# Patient Record
Sex: FEMALE | Race: WHITE | NOT HISPANIC OR LATINO | Employment: FULL TIME | URBAN - NONMETROPOLITAN AREA
[De-identification: names, ages, dates, MRNs, and addresses within clinical notes are randomized per-mention and may not be internally consistent; named-entity substitution may affect disease eponyms.]

---

## 2018-10-18 ENCOUNTER — OFFICE VISIT (OUTPATIENT)
Dept: GASTROENTEROLOGY | Facility: CLINIC | Age: 48
End: 2018-10-18

## 2018-10-18 VITALS
SYSTOLIC BLOOD PRESSURE: 100 MMHG | DIASTOLIC BLOOD PRESSURE: 60 MMHG | HEIGHT: 63 IN | HEART RATE: 89 BPM | OXYGEN SATURATION: 99 % | WEIGHT: 189.2 LBS | BODY MASS INDEX: 33.52 KG/M2

## 2018-10-18 DIAGNOSIS — Z12.11 ENCOUNTER FOR SCREENING COLONOSCOPY: Primary | ICD-10-CM

## 2018-10-18 PROCEDURE — S0285 CNSLT BEFORE SCREEN COLONOSC: HCPCS | Performed by: NURSE PRACTITIONER

## 2018-10-18 RX ORDER — DEXTROSE AND SODIUM CHLORIDE 5; .45 G/100ML; G/100ML
30 INJECTION, SOLUTION INTRAVENOUS CONTINUOUS PRN
Status: CANCELLED | OUTPATIENT
Start: 2018-12-14

## 2018-10-18 RX ORDER — SODIUM, POTASSIUM,MAG SULFATES 17.5-3.13G
1 SOLUTION, RECONSTITUTED, ORAL ORAL EVERY 12 HOURS
Qty: 2 BOTTLE | Refills: 0 | Status: ON HOLD | OUTPATIENT
Start: 2018-10-18 | End: 2018-12-14

## 2018-10-18 NOTE — PATIENT INSTRUCTIONS
Colonoscopy, Adult  A colonoscopy is an exam to look at the entire large intestine. During the exam, a lubricated, bendable tube is inserted into the anus and then passed into the rectum, colon, and other parts of the large intestine.  A colonoscopy is often done as a part of normal colorectal screening or in response to certain symptoms, such as anemia, persistent diarrhea, abdominal pain, and blood in the stool. The exam can help screen for and diagnose medical problems, including:  · Tumors.  · Polyps.  · Inflammation.  · Areas of bleeding.    Tell a health care provider about:  · Any allergies you have.  · All medicines you are taking, including vitamins, herbs, eye drops, creams, and over-the-counter medicines.  · Any problems you or family members have had with anesthetic medicines.  · Any blood disorders you have.  · Any surgeries you have had.  · Any medical conditions you have.  · Any problems you have had passing stool.  What are the risks?  Generally, this is a safe procedure. However, problems may occur, including:  · Bleeding.  · A tear in the intestine.  · A reaction to medicines given during the exam.  · Infection (rare).    What happens before the procedure?  Eating and drinking restrictions  Follow instructions from your health care provider about eating and drinking, which may include:  · A few days before the procedure - follow a low-fiber diet. Avoid nuts, seeds, dried fruit, raw fruits, and vegetables.  · 1-3 days before the procedure - follow a clear liquid diet. Drink only clear liquids, such as clear broth or bouillon, black coffee or tea, clear juice, clear soft drinks or sports drinks, gelatin dessert, and popsicles. Avoid any liquids that contain red or purple dye.  · On the day of the procedure - do not eat or drink anything during the 2 hours before the procedure, or within the time period that your health care provider recommends.    Bowel prep  If you were prescribed an oral bowel prep  to clean out your colon:  · Take it as told by your health care provider. Starting the day before your procedure, you will need to drink a large amount of medicated liquid. The liquid will cause you to have multiple loose stools until your stool is almost clear or light green.  · If your skin or anus gets irritated from diarrhea, you may use these to relieve the irritation:  ? Medicated wipes, such as adult wet wipes with aloe and vitamin E.  ? A skin soothing-product like petroleum jelly.  · If you vomit while drinking the bowel prep, take a break for up to 60 minutes and then begin the bowel prep again. If vomiting continues and you cannot take the bowel prep without vomiting, call your health care provider.    General instructions  · Ask your health care provider about changing or stopping your regular medicines. This is especially important if you are taking diabetes medicines or blood thinners.  · Plan to have someone take you home from the hospital or clinic.  What happens during the procedure?  · An IV tube may be inserted into one of your veins.  · You will be given medicine to help you relax (sedative).  · To reduce your risk of infection:  ? Your health care team will wash or sanitize their hands.  ? Your anal area will be washed with soap.  · You will be asked to lie on your side with your knees bent.  · Your health care provider will lubricate a long, thin, flexible tube. The tube will have a camera and a light on the end.  · The tube will be inserted into your anus.  · The tube will be gently eased through your rectum and colon.  · Air will be delivered into your colon to keep it open. You may feel some pressure or cramping.  · The camera will be used to take images during the procedure.  · A small tissue sample may be removed from your body to be examined under a microscope (biopsy). If any potential problems are found, the tissue will be sent to a lab for testing.  · If small polyps are found, your  health care provider may remove them and have them checked for cancer cells.  · The tube that was inserted into your anus will be slowly removed.  The procedure may vary among health care providers and hospitals.  What happens after the procedure?  · Your blood pressure, heart rate, breathing rate, and blood oxygen level will be monitored until the medicines you were given have worn off.  · Do not drive for 24 hours after the exam.  · You may have a small amount of blood in your stool.  · You may pass gas and have mild abdominal cramping or bloating due to the air that was used to inflate your colon during the exam.  · It is up to you to get the results of your procedure. Ask your health care provider, or the department performing the procedure, when your results will be ready.  This information is not intended to replace advice given to you by your health care provider. Make sure you discuss any questions you have with your health care provider.  Document Released: 12/15/2001 Document Revised: 10/18/2017 Document Reviewed: 02/28/2017  [employer] Interactive Patient Education © 2018 [employer]

## 2018-10-18 NOTE — PROGRESS NOTES
Chief Complaint   Patient presents with   • Colon Cancer Screening       Subjective    Merry Erickson is a 47 y.o. female. she is being seen for consultation today at the request of Dr. Batista   History of Present Illness  47-year-old female presents to discuss screening colonoscopy.  She denies any abdominal pain, nausea, vomiting or changes in her bowel habits.  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  Currently on no medications.  Reports she has irritable bowel syndrome and occasional urgency and diarrhea if she eats certain foods such as onions.  Denies any melena or hematochezia.  Her weight is stable.  Plan; schedule patient for screening colonoscopy.       The following portions of the patient's history were reviewed and updated as appropriate:   History reviewed. No pertinent past medical history.  History reviewed. No pertinent surgical history.  Family History   Problem Relation Age of Onset   • Colon cancer Paternal Grandmother      OB History      Para Term  AB Living    4 4 4          SAB TAB Ectopic Molar Multiple Live Births                       Current Outpatient Prescriptions   Medication Sig Dispense Refill   • sodium-potassium-magnesium sulfates (SUPREP BOWEL PREP KIT) 17.5-3.13-1.6 GM/177ML solution oral solution Take 1 bottle by mouth Every 12 (Twelve) Hours. 2 bottle 0     No current facility-administered medications for this visit.      No Known Allergies  Social History     Social History   • Marital status:      Social History Main Topics   • Smoking status: Never Smoker   • Smokeless tobacco: Never Used   • Alcohol use No   • Drug use: No   • Sexual activity: Defer     Other Topics Concern   • Not on file       Review of Systems  Review of Systems   Constitutional: Negative for activity change, appetite change, chills, diaphoresis, fatigue, fever and unexpected weight change.   HENT: Negative for sore throat and trouble swallowing.   "  Respiratory: Negative for shortness of breath.    Gastrointestinal: Negative for abdominal distention, abdominal pain, anal bleeding, blood in stool, constipation, diarrhea, nausea, rectal pain and vomiting.   Musculoskeletal: Negative for arthralgias.   Skin: Negative for pallor.   Neurological: Negative for light-headedness.        /60 (BP Location: Left arm)   Pulse 89   Ht 158.8 cm (62.5\")   Wt 85.8 kg (189 lb 3.2 oz)   SpO2 99%   BMI 34.05 kg/m²     Objective    Physical Exam   Constitutional: She is oriented to person, place, and time. She appears well-developed and well-nourished. She is cooperative. No distress.   HENT:   Head: Normocephalic and atraumatic.   Neck: Normal range of motion. Neck supple. No thyromegaly present.   Cardiovascular: Normal rate, regular rhythm and normal heart sounds.    Pulmonary/Chest: Effort normal and breath sounds normal. She has no wheezes. She has no rhonchi. She has no rales.   Abdominal: Soft. Normal appearance and bowel sounds are normal. She exhibits no distension. There is no hepatosplenomegaly. There is no tenderness. There is no rigidity and no guarding. No hernia.   Lymphadenopathy:     She has no cervical adenopathy.   Neurological: She is alert and oriented to person, place, and time.   Skin: Skin is warm, dry and intact. No rash noted. No pallor.   Psychiatric: She has a normal mood and affect. Her speech is normal.     Conversion Encounter on 04/07/2014   Component Date Value Ref Range Status   • Spec Descr 1 04/07/2014 SPECIMEN(S): Cervical/Endocervical   Final   • Clinical Information 04/07/2014 CLINICAL HISTORY: Reason for Pap Smear: Routine Smear, LMP: 3/10/14   Final   • Specimen Adequacy 04/07/2014    Final                    Value:  SPECIMEN ADEQUACY:  Satisfactory for evaluation:  endocervical/transformation zone component  present.     • Final Diagnosis 04/07/2014    Final                    Value:  INTERPRETATION:  Benign cellular changes, " reactive cellular changes.  Negative for intraepithelial lesion or malignancy.     • CONVERTED (HISTORICAL) FINAL PATHO* 04/07/2014    Final                    Value:Diagnostician:  HERIBERTO ORTIZ(ASCP)  Cytotechnologist     • Final Pathologist/Cyto tech 3 04/07/2014    Final                    Value:Diagnostician:  KAYLA SWEENEY M.D.  Pathologist  Electronically Signed 04/10/2014     • ICD9 Diagnosis Code 1 04/07/2014 ICD-9: V72.31   Final   • DISCLAIMER 04/07/2014    Final                    Value:The Pap smear is a screening test that aids in the detection of cervical  cancer and cancer precursors.  Both false positive and false negative  results can occur.  The test should be used at regular intervals, and positive results  should be confirmed before definitive therapy.       Assessment/Plan      1. Encounter for screening colonoscopy    .       Orders placed during this encounter include:  Orders Placed This Encounter   Procedures   • Follow Anesthesia Guidelines / Standing Orders     Standing Status:   Future       COLONOSCOPY (N/A)    Review and/or summary of lab tests, radiology, procedures, medications. Review and summary of old records and obtaining of history. The risks and benefits of my recommendations, as well as other treatment options were discussed with the patient today. Questions were answered.    New Medications Ordered This Visit   Medications   • sodium-potassium-magnesium sulfates (SUPREP BOWEL PREP KIT) 17.5-3.13-1.6 GM/177ML solution oral solution     Sig: Take 1 bottle by mouth Every 12 (Twelve) Hours.     Dispense:  2 bottle     Refill:  0       Follow-up: Return in about 8 weeks (around 12/13/2018).          This document has been electronically signed by LAISHA Gotti on October 18, 2018 4:21 PM             Results for orders placed or performed in visit on 04/07/14   Converted (Historical) Gyn Cytology   Result Value Ref Range    Spec Descr 1 SPECIMEN(S):  Cervical/Endocervical     Clinical Information       CLINICAL HISTORY: Reason for Pap Smear: Routine Smear, LMP: 3/10/14    Specimen Adequacy         SPECIMEN ADEQUACY:  Satisfactory for evaluation:  endocervical/transformation zone component  present.      Final Diagnosis         INTERPRETATION:  Benign cellular changes, reactive cellular changes.  Negative for intraepithelial lesion or malignancy.      CONVERTED (HISTORICAL) FINAL PATHOLOGIST       Diagnostician:  HERIBERTO BLOCK CT([facility])  Cytotechnologist      Final Pathologist/Cyto tech 3       Diagnostician:  KAYLA SWEENEY M.D.  Pathologist  Electronically Signed 04/10/2014      ICD9 Diagnosis Code 1 ICD-9: V72.31     DISCLAIMER       The Pap smear is a screening test that aids in the detection of cervical  cancer and cancer precursors.  Both false positive and false negative  results can occur.  The test should be used at regular intervals, and positive results  should be confirmed before definitive therapy.     Results for orders placed or performed in visit on 05/24/11   Converted (Historical) Gyn Cytology   Result Value Ref Range    Spec Descr 1 SPECIMEN(S): Cervical/Endocervical     Clinical Information       CLINICAL HISTORY: Reason for Pap Smear: Routine Smear, LMP: 5/15/11    Specimen Adequacy         SPECIMEN ADEQUACY:  Satisfactory for evaluation:  endocervical/transformation zone component  present.      Final Diagnosis         INTERPRETATION:  Negative for intraepithelial lesion or malignancy.      CONVERTED (HISTORICAL) FINAL PATHOLOGIST       Diagnostician:  HERIBERTO BLOCK CT([facility])  Cytotechnologist  Electronically Signed 05/26/2011      ICD9 Diagnosis Code 1 ICD-9: V72.31     DISCLAIMER       The Pap smear is a screening test that aids in the detection of cervical  cancer and cancer precursors.  Both false positive and false negative  results can occur.  The test should be used at regular intervals, and positive results  should be confirmed  before definitive therapy.     Results for orders placed or performed in visit on 09/21/09   Converted (Historical) Gyn Cytology   Result Value Ref Range    Spec Descr 1 SPECIMEN(S): Cervical/Endocervical     Clinical Information       CLINICAL HISTORY: Reason for Pap Smear: Routine Smear, LMP: 9/10/09    Specimen Adequacy         SPECIMEN ADEQUACY:  Satisfactory for evaluation:  endocervical/transformation zone component  present.      Final Diagnosis         INTERPRETATION:  Negative for intraepithelial lesion or malignancy.      CONVERTED (HISTORICAL) FINAL PATHOLOGIST       Diagnostician:  RADHA ORTIZ([facility])  Cytotechnologist  Electronically Signed 09/23/2009      ICD9 Diagnosis Code 1 ICD-9: V72.31     DISCLAIMER       The Pap smear is a screening test that aids in the detection of cervical  cancer and cancer precursors.  Both false positive and false negative  results can occur.  The test should be used at regular intervals, and positive results  should be confirmed before definitive therapy.

## 2018-12-14 ENCOUNTER — ANESTHESIA EVENT (OUTPATIENT)
Dept: GASTROENTEROLOGY | Facility: HOSPITAL | Age: 48
End: 2018-12-14

## 2018-12-14 ENCOUNTER — ANESTHESIA (OUTPATIENT)
Dept: GASTROENTEROLOGY | Facility: HOSPITAL | Age: 48
End: 2018-12-14

## 2018-12-14 ENCOUNTER — HOSPITAL ENCOUNTER (OUTPATIENT)
Facility: HOSPITAL | Age: 48
Setting detail: HOSPITAL OUTPATIENT SURGERY
Discharge: HOME | End: 2018-12-14
Attending: INTERNAL MEDICINE | Admitting: INTERNAL MEDICINE

## 2018-12-14 VITALS
HEART RATE: 86 BPM | BODY MASS INDEX: 31.89 KG/M2 | WEIGHT: 180 LBS | HEIGHT: 63 IN | DIASTOLIC BLOOD PRESSURE: 66 MMHG | OXYGEN SATURATION: 98 % | SYSTOLIC BLOOD PRESSURE: 102 MMHG | RESPIRATION RATE: 18 BRPM | TEMPERATURE: 98.2 F

## 2018-12-14 DIAGNOSIS — Z12.11 ENCOUNTER FOR SCREENING COLONOSCOPY: ICD-10-CM

## 2018-12-14 LAB — B-HCG UR QL: NEGATIVE

## 2018-12-14 PROCEDURE — 25010000002 PROPOFOL 10 MG/ML EMULSION: Performed by: NURSE ANESTHETIST, CERTIFIED REGISTERED

## 2018-12-14 PROCEDURE — 45378 DIAGNOSTIC COLONOSCOPY: CPT | Performed by: INTERNAL MEDICINE

## 2018-12-14 PROCEDURE — 81025 URINE PREGNANCY TEST: CPT | Performed by: INTERNAL MEDICINE

## 2018-12-14 RX ORDER — PROPOFOL 10 MG/ML
VIAL (ML) INTRAVENOUS AS NEEDED
Status: DISCONTINUED | OUTPATIENT
Start: 2018-12-14 | End: 2018-12-14 | Stop reason: SURG

## 2018-12-14 RX ORDER — DEXTROSE AND SODIUM CHLORIDE 5; .45 G/100ML; G/100ML
30 INJECTION, SOLUTION INTRAVENOUS CONTINUOUS PRN
Status: DISCONTINUED | OUTPATIENT
Start: 2018-12-14 | End: 2018-12-14 | Stop reason: HOSPADM

## 2018-12-14 RX ORDER — LIDOCAINE HYDROCHLORIDE 10 MG/ML
INJECTION, SOLUTION INFILTRATION; PERINEURAL AS NEEDED
Status: DISCONTINUED | OUTPATIENT
Start: 2018-12-14 | End: 2018-12-14 | Stop reason: SURG

## 2018-12-14 RX ORDER — BLACK COHOSH ROOT EXTRACT 80 MG
1 CAPSULE ORAL 2 TIMES DAILY
COMMUNITY

## 2018-12-14 RX ADMIN — LIDOCAINE HYDROCHLORIDE 50 MG: 10 INJECTION, SOLUTION INFILTRATION; PERINEURAL at 10:06

## 2018-12-14 RX ADMIN — DEXTROSE AND SODIUM CHLORIDE 30 ML/HR: 5; 450 INJECTION, SOLUTION INTRAVENOUS at 09:59

## 2018-12-14 RX ADMIN — PROPOFOL 100 MG: 10 INJECTION, EMULSION INTRAVENOUS at 10:07

## 2018-12-14 RX ADMIN — PROPOFOL 100 MG: 10 INJECTION, EMULSION INTRAVENOUS at 10:12

## 2018-12-14 NOTE — ANESTHESIA PREPROCEDURE EVALUATION
Anesthesia Evaluation     Patient summary reviewed and Nursing notes reviewed   NPO Solid Status: > 8 hours  NPO Liquid Status: > 8 hours           Airway   Mallampati: I  TM distance: >3 FB  Neck ROM: full  No difficulty expected  Dental - normal exam     Pulmonary - negative pulmonary ROS and normal exam   Cardiovascular - negative cardio ROS and normal exam        Neuro/Psych- negative ROS  GI/Hepatic/Renal/Endo - negative ROS     Musculoskeletal (-) negative ROS    Abdominal  - normal exam    Bowel sounds: normal.   Substance History - negative use     OB/GYN negative ob/gyn ROS         Other                        Anesthesia Plan    ASA 2     MAC     intravenous induction   Anesthetic plan, all risks, benefits, and alternatives have been provided, discussed and informed consent has been obtained with: patient.    Plan discussed with CRNA.

## 2018-12-14 NOTE — H&P
No chief complaint on file.      Subjective    Merry Erickson is a 48 y.o. female. she is being seen for consultation today at the request of Dr. Batista   History of Present Illness  47-year-old female presents to discuss screening colonoscopy.  She denies any abdominal pain, nausea, vomiting or changes in her bowel habits.  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  Currently on no medications.  Reports she has irritable bowel syndrome and occasional urgency and diarrhea if she eats certain foods such as onions.  Denies any melena or hematochezia.  Her weight is stable.  Plan; schedule patient for screening colonoscopy.       The following portions of the patient's history were reviewed and updated as appropriate:   History reviewed. No pertinent past medical history.  History reviewed. No pertinent surgical history.  Family History   Problem Relation Age of Onset   • Colon cancer Paternal Grandmother      OB History      Para Term  AB Living    4 4 4          SAB TAB Ectopic Molar Multiple Live Births                       Current Facility-Administered Medications   Medication Dose Route Frequency Provider Last Rate Last Dose   • dextrose 5 % and sodium chloride 0.45 % infusion  30 mL/hr Intravenous Continuous PRN Rut Smith APRN         No Known Allergies  Social History     Socioeconomic History   • Marital status:      Spouse name: Not on file   • Number of children: Not on file   • Years of education: Not on file   • Highest education level: Not on file   Tobacco Use   • Smoking status: Never Smoker   • Smokeless tobacco: Never Used   Substance and Sexual Activity   • Alcohol use: Yes     Comment: occasionally   • Drug use: No   • Sexual activity: Defer       Review of Systems  Review of Systems   Constitutional: Negative for activity change, appetite change, chills, diaphoresis, fatigue, fever and unexpected weight change.   HENT: Negative for sore throat  and trouble swallowing.    Respiratory: Negative for shortness of breath.    Gastrointestinal: Negative for abdominal distention, abdominal pain, anal bleeding, blood in stool, constipation, diarrhea, nausea, rectal pain and vomiting.   Musculoskeletal: Negative for arthralgias.   Skin: Negative for pallor.   Neurological: Negative for light-headedness.        There were no vitals taken for this visit.    Objective    Physical Exam   Constitutional: She is oriented to person, place, and time. She appears well-developed and well-nourished. She is cooperative. No distress.   HENT:   Head: Normocephalic and atraumatic.   Neck: Normal range of motion. Neck supple. No thyromegaly present.   Cardiovascular: Normal rate, regular rhythm and normal heart sounds.   Pulmonary/Chest: Effort normal and breath sounds normal. She has no wheezes. She has no rhonchi. She has no rales.   Abdominal: Soft. Normal appearance and bowel sounds are normal. She exhibits no distension. There is no hepatosplenomegaly. There is no tenderness. There is no rigidity and no guarding. No hernia.   Lymphadenopathy:     She has no cervical adenopathy.   Neurological: She is alert and oriented to person, place, and time.   Skin: Skin is warm, dry and intact. No rash noted. No pallor.   Psychiatric: She has a normal mood and affect. Her speech is normal.     Conversion Encounter on 04/07/2014   Component Date Value Ref Range Status   • Spec Descr 1 04/07/2014 SPECIMEN(S): Cervical/Endocervical   Final   • Clinical Information 04/07/2014 CLINICAL HISTORY: Reason for Pap Smear: Routine Smear, LMP: 3/10/14   Final   • Specimen Adequacy 04/07/2014    Final                    Value:  SPECIMEN ADEQUACY:  Satisfactory for evaluation:  endocervical/transformation zone component  present.     • Final Diagnosis 04/07/2014    Final                    Value:  INTERPRETATION:  Benign cellular changes, reactive cellular changes.  Negative for intraepithelial lesion  or malignancy.     • CONVERTED (HISTORICAL) FINAL PATHO* 04/07/2014    Final                    Value:Diagnostician:  HERIBERTO ORTIZ(ASCP)  Cytotechnologist     • Final Pathologist/Cyto tech 3 04/07/2014    Final                    Value:Diagnostician:  KAYLA SWEENEY M.D.  Pathologist  Electronically Signed 04/10/2014     • ICD9 Diagnosis Code 1 04/07/2014 ICD-9: V72.31   Final   • DISCLAIMER 04/07/2014    Final                    Value:The Pap smear is a screening test that aids in the detection of cervical  cancer and cancer precursors.  Both false positive and false negative  results can occur.  The test should be used at regular intervals, and positive results  should be confirmed before definitive therapy.       Assessment/Plan      1. Encounter for screening colonoscopy    .       Orders placed during this encounter include:  Orders Placed This Encounter   Procedures   • Obtain Informed Consent     Standing Status:   Standing     Number of Occurrences:   1     Order Specific Question:   Informed Consent Given For     Answer:   Colonoscopy   • POC Glucose Once     Prior to Procedure on ALL Diabetic Patients     Standing Status:   Standing     Number of Occurrences:   1   • Insert Peripheral IV     Standing Status:   Standing     Number of Occurrences:   1       COLONOSCOPY (N/A)    Review and/or summary of lab tests, radiology, procedures, medications. Review and summary of old records and obtaining of history. The risks and benefits of my recommendations, as well as other treatment options were discussed with the patient today. Questions were answered.    New Medications Ordered This Visit   Medications   • dextrose 5 % and sodium chloride 0.45 % infusion       Follow-up: No Follow-up on file.          This document has been electronically signed by Kei Gonzales MD on December 14, 2018 9:33 AM          This document has been electronically signed by Kei Gonzales MD on December 14, 2018 9:33 AM              Results for orders placed or performed in visit on 04/07/14   Converted (Historical) Gyn Cytology   Result Value Ref Range    Spec Descr 1 SPECIMEN(S): Cervical/Endocervical     Clinical Information       CLINICAL HISTORY: Reason for Pap Smear: Routine Smear, LMP: 3/10/14    Specimen Adequacy         SPECIMEN ADEQUACY:  Satisfactory for evaluation:  endocervical/transformation zone component  present.      Final Diagnosis         INTERPRETATION:  Benign cellular changes, reactive cellular changes.  Negative for intraepithelial lesion or malignancy.      CONVERTED (HISTORICAL) FINAL PATHOLOGIST       Diagnostician:  HERIBERTO ORTIZ([facility])  Cytotechnologist      Final Pathologist/Cyto tech 3       Diagnostician:  KAYLA SWEENEY M.D.  Pathologist  Electronically Signed 04/10/2014      ICD9 Diagnosis Code 1 ICD-9: V72.31     DISCLAIMER       The Pap smear is a screening test that aids in the detection of cervical  cancer and cancer precursors.  Both false positive and false negative  results can occur.  The test should be used at regular intervals, and positive results  should be confirmed before definitive therapy.     Results for orders placed or performed in visit on 05/24/11   Converted (Historical) Gyn Cytology   Result Value Ref Range    Spec Descr 1 SPECIMEN(S): Cervical/Endocervical     Clinical Information       CLINICAL HISTORY: Reason for Pap Smear: Routine Smear, LMP: 5/15/11    Specimen Adequacy         SPECIMEN ADEQUACY:  Satisfactory for evaluation:  endocervical/transformation zone component  present.      Final Diagnosis         INTERPRETATION:  Negative for intraepithelial lesion or malignancy.      CONVERTED (HISTORICAL) FINAL PATHOLOGIST       Diagnostician:  [facility]  Cytotechnologist  Electronically Signed 05/26/2011      ICD9 Diagnosis Code 1 ICD-9: V72.31     DISCLAIMER       The Pap smear is a screening test that aids in the detection of cervical  cancer and cancer  precursors.  Both false positive and false negative  results can occur.  The test should be used at regular intervals, and positive results  should be confirmed before definitive therapy.     Results for orders placed or performed in visit on 09/21/09   Converted (Historical) Gyn Cytology   Result Value Ref Range    Spec Descr 1 SPECIMEN(S): Cervical/Endocervical     Clinical Information       CLINICAL HISTORY: Reason for Pap Smear: Routine Smear, LMP: 9/10/09    Specimen Adequacy         SPECIMEN ADEQUACY:  Satisfactory for evaluation:  endocervical/transformation zone component  present.      Final Diagnosis         INTERPRETATION:  Negative for intraepithelial lesion or malignancy.      CONVERTED (HISTORICAL) FINAL PATHOLOGIST       Diagnostician:  RADHA ORTIZ(ASCP)  Cytotechnologist  Electronically Signed 09/23/2009      ICD9 Diagnosis Code 1 ICD-9: V72.31     DISCLAIMER       The Pap smear is a screening test that aids in the detection of cervical  cancer and cancer precursors.  Both false positive and false negative  results can occur.  The test should be used at regular intervals, and positive results  should be confirmed before definitive therapy.

## 2018-12-14 NOTE — ANESTHESIA POSTPROCEDURE EVALUATION
Patient: Merry Erickson    Procedure Summary     Date:  12/14/18 Room / Location:  [facility] ENDOSCOPY 1 / [facility] ENDOSCOPY    Anesthesia Start:  1004 Anesthesia Stop:  1021    Procedure:  COLONOSCOPY (N/A ) Diagnosis:       Encounter for screening colonoscopy      (Encounter for screening colonoscopy [Z12.11])    Surgeon:  Kei Gonzales MD Provider:  Claude Zhong CRNA    Anesthesia Type:  MAC ASA Status:  2          Anesthesia Type: MAC  Last vitals  BP   118/81 (12/14/18 0947)   Temp   97.3 °F (36.3 °C) (12/14/18 0947)   Pulse   84 (12/14/18 0947)   Resp   18 (12/14/18 0947)     SpO2   100 % (12/14/18 0947)     Post Anesthesia Care and Evaluation    Patient location during evaluation: bedside  Patient participation: complete - patient participated  Level of consciousness: awake and alert  Pain score: 1  Pain management: adequate  Airway patency: patent  Anesthetic complications: No anesthetic complications  PONV Status: none  Cardiovascular status: acceptable  Respiratory status: acceptable  Hydration status: acceptable  Post Neuraxial Block status: Motor and sensory function returned to baseline

## (undated) DEVICE — CANN SMPL SOFTECH BIFLO ETCO2 A/M 7FT